# Patient Record
Sex: FEMALE | Race: WHITE | NOT HISPANIC OR LATINO | Employment: OTHER | URBAN - METROPOLITAN AREA
[De-identification: names, ages, dates, MRNs, and addresses within clinical notes are randomized per-mention and may not be internally consistent; named-entity substitution may affect disease eponyms.]

---

## 2018-02-19 NOTE — PATIENT DISCUSSION
LATTICE DEGENERATION OU: PT EDUCATED THAT LATTICE DEGENERATION GIVES INCREASED RISK OF RETINAL DETACHMENT AND TO AVOID REPETITIVE KNOCKS TO THE HEAD. PT EDUCATED ON S/S OF RETINAL PATHOLOGY AND TO RETURN ASAP IF NOTED.

## 2021-09-20 ENCOUNTER — PREPPED CHART (OUTPATIENT)
Dept: URBAN - METROPOLITAN AREA CLINIC 10 | Facility: CLINIC | Age: 36
End: 2021-09-20

## 2021-10-29 ENCOUNTER — FOLLOW UP (OUTPATIENT)
Dept: URBAN - METROPOLITAN AREA CLINIC 10 | Facility: CLINIC | Age: 36
End: 2021-10-29

## 2021-10-29 ASSESSMENT — VISUAL ACUITY
OD_SC: 20/25
OU_SC: 20/25
OS_SC: 20/40

## 2022-06-06 ENCOUNTER — ESTABLISHED PATIENT (OUTPATIENT)
Dept: URBAN - METROPOLITAN AREA CLINIC 10 | Facility: CLINIC | Age: 37
End: 2022-06-06

## 2022-06-06 DIAGNOSIS — D31.32: ICD-10-CM

## 2022-06-06 DIAGNOSIS — H52.03: ICD-10-CM

## 2022-06-06 PROCEDURE — 92014 COMPRE OPH EXAM EST PT 1/>: CPT

## 2022-06-06 PROCEDURE — 92015 DETERMINE REFRACTIVE STATE: CPT

## 2022-06-06 ASSESSMENT — TONOMETRY
OD_IOP_MMHG: 14
OS_IOP_MMHG: 14

## 2022-06-06 ASSESSMENT — KERATOMETRY
OD_K1POWER_DIOPTERS: 43.25
OS_AXISANGLE_DEGREES: 85
OS_AXISANGLE2_DEGREES: 175
OD_AXISANGLE_DEGREES: 175
OD_K2POWER_DIOPTERS: 44.25
OD_AXISANGLE2_DEGREES: 85
OS_K1POWER_DIOPTERS: 43.50
OS_K2POWER_DIOPTERS: 44.50

## 2022-06-22 RX ORDER — VALACYCLOVIR HYDROCHLORIDE 500 MG/1
TABLET, FILM COATED ORAL
COMMUNITY

## 2022-06-22 RX ORDER — DOXYLAMINE SUCCINATE AND PYRIDOXINE HYDROCHLORIDE 20; 20 MG/1; MG/1
TABLET, EXTENDED RELEASE ORAL
COMMUNITY
Start: 2020-07-16

## 2022-06-22 RX ORDER — LEVONORGESTREL AND ETHINYL ESTRADIOL 0.1-0.02MG
KIT ORAL
COMMUNITY
Start: 2018-02-20

## 2022-08-20 NOTE — PATIENT DISCUSSION
ARTIFICIAL TEARS to affected eye(s) as needed.
BMI above normal limits, recommended weight loss, improve diet and follow up with internist.
Cataract APPEARS VISUALLY SIGNIFICANT.
Discussed the importance of blood sugar control in the prevention of ocular complications.
Does NOT APPEAR VISUALLY SIGNIFICANT.
Dry ARMD is responsible for some decrease in vision.
ERM does NOT APPEAR VISUALLY SIGNIFICANT.
MONITOR response to TREATMENT.
My findings and recommendations are based on patient's symptoms, eye exam, diagnostic testing, and records.
No RETINAL CONTRAINDICATION to cataract surgery.
No retinal holes or tears seen on exam. Recommended OBSERVATION. We reviewed the signs and symptoms of retinal tear/retinal detachment and the importance of prompt evaluation should there be increasing floaters, new flashing lights, or decreasing peripheral vision in either eye at any time. Patient understands condition, prognosis and need for follow up care.
Patient advised that RETINAL CONDITION MAY LIMIT VISUAL RECOVERY following cataract surgery.
Recommend OBSERVATION and continued MONITORING for progression.
Recommended OBSERVATION and continued MONITORING for progression.
Recommended weight and BMI control through healthy diet and exercise, green leafy veggies, UV protection, and not smoking. Reviewed the importance of daily monitoring of the vision in each eye independently, along with the use of the Amsler grid daily and instructed patient to call and return immediately for any new changes in their vision or on the Amsler grid. Patient instructed on the importance of regular follow up and monitoring for the early detection of conversion to wet AMD as early detection results in early treatment and better outcomes.
Retinal exam findings communicated to Physician managing diabetes.
negative - no cough

## 2024-04-22 ENCOUNTER — ESTABLISHED PATIENT (OUTPATIENT)
Dept: URBAN - METROPOLITAN AREA CLINIC 10 | Facility: CLINIC | Age: 39
End: 2024-04-22

## 2024-04-22 DIAGNOSIS — H52.03: ICD-10-CM

## 2024-04-22 DIAGNOSIS — D31.32: ICD-10-CM

## 2024-04-22 PROCEDURE — 92015 DETERMINE REFRACTIVE STATE: CPT

## 2024-04-22 PROCEDURE — 92014 COMPRE OPH EXAM EST PT 1/>: CPT

## 2024-04-22 PROCEDURE — 92250 FUNDUS PHOTOGRAPHY W/I&R: CPT

## 2024-04-22 ASSESSMENT — VISUAL ACUITY
OU_SC: 20/20
OS_SC: 20/20
OD_SC: 20/20

## 2024-04-22 ASSESSMENT — TONOMETRY
OD_IOP_MMHG: 18
OS_IOP_MMHG: 19

## 2024-04-22 ASSESSMENT — KERATOMETRY
OS_K2POWER_DIOPTERS: 44.50
OS_K1POWER_DIOPTERS: 43.50
OD_AXISANGLE2_DEGREES: 81
OS_AXISANGLE_DEGREES: 174
OS_AXISANGLE2_DEGREES: 84
OD_AXISANGLE_DEGREES: 171
OD_K2POWER_DIOPTERS: 44.00
OD_K1POWER_DIOPTERS: 43.25